# Patient Record
Sex: MALE | Race: BLACK OR AFRICAN AMERICAN | NOT HISPANIC OR LATINO | Employment: FULL TIME | URBAN - METROPOLITAN AREA
[De-identification: names, ages, dates, MRNs, and addresses within clinical notes are randomized per-mention and may not be internally consistent; named-entity substitution may affect disease eponyms.]

---

## 2019-10-25 ENCOUNTER — HOSPITAL ENCOUNTER (EMERGENCY)
Facility: HOSPITAL | Age: 49
Discharge: HOME | End: 2019-10-26
Attending: EMERGENCY MEDICINE
Payer: COMMERCIAL

## 2019-10-25 DIAGNOSIS — M54.9 BACK PAIN, UNSPECIFIED BACK LOCATION, UNSPECIFIED BACK PAIN LATERALITY, UNSPECIFIED CHRONICITY: Primary | ICD-10-CM

## 2019-10-25 DIAGNOSIS — M79.602 LEFT ARM PAIN: ICD-10-CM

## 2019-10-25 LAB — POCT GLUCOSE: 113 MG/DL (ref 70–110)

## 2019-10-25 PROCEDURE — 63600175 PHARM REV CODE 636 W HCPCS: Performed by: EMERGENCY MEDICINE

## 2019-10-25 PROCEDURE — 99284 EMERGENCY DEPT VISIT MOD MDM: CPT | Mod: 25

## 2019-10-25 PROCEDURE — 96372 THER/PROPH/DIAG INJ SC/IM: CPT

## 2019-10-25 PROCEDURE — 82962 GLUCOSE BLOOD TEST: CPT

## 2019-10-25 RX ORDER — CYCLOBENZAPRINE HCL 10 MG
5 TABLET ORAL NIGHTLY PRN
Qty: 3 TABLET | Refills: 0 | Status: SHIPPED | OUTPATIENT
Start: 2019-10-25 | End: 2019-10-30

## 2019-10-25 RX ORDER — KETOROLAC TROMETHAMINE 30 MG/ML
30 INJECTION, SOLUTION INTRAMUSCULAR; INTRAVENOUS
Status: COMPLETED | OUTPATIENT
Start: 2019-10-25 | End: 2019-10-25

## 2019-10-25 RX ORDER — ACETAMINOPHEN 500 MG
500 TABLET ORAL EVERY 6 HOURS PRN
Qty: 28 TABLET | Refills: 0 | Status: SHIPPED | OUTPATIENT
Start: 2019-10-25

## 2019-10-25 RX ORDER — MELOXICAM 7.5 MG/1
7.5 TABLET ORAL DAILY
Qty: 5 TABLET | Refills: 0 | Status: SHIPPED | OUTPATIENT
Start: 2019-10-25

## 2019-10-25 RX ADMIN — KETOROLAC TROMETHAMINE 30 MG: 30 INJECTION, SOLUTION INTRAMUSCULAR at 11:10

## 2019-10-26 VITALS
TEMPERATURE: 99 F | SYSTOLIC BLOOD PRESSURE: 151 MMHG | BODY MASS INDEX: 35.43 KG/M2 | OXYGEN SATURATION: 95 % | HEART RATE: 91 BPM | DIASTOLIC BLOOD PRESSURE: 88 MMHG | WEIGHT: 285 LBS | HEIGHT: 75 IN | RESPIRATION RATE: 18 BRPM

## 2019-10-26 NOTE — DISCHARGE INSTRUCTIONS
Please follow up with your primary doctor this week and return to the ED immediately if you have worsening or new symptoms.

## 2019-10-26 NOTE — ED PROVIDER NOTES
Encounter Date: 10/25/2019    SCRIBE #1 NOTE: I, Kavin Lorenzo, am scribing for, and in the presence of, Abe Hale MD.       History     Chief Complaint   Patient presents with    Back Pain     To ER with c/o lower back pain after working today and left arm pain after sleeping on it tonight.  Pt denies trying medication for pain.     Jeffrey Lockhart is a 48 y.o. male who  has no past medical history on file.    The patient presents to the ED due to back pain that started today. Patient reports intermittent low back pain that worsened after lifting and carrying heavy items today at therefore. No recent fall or trauma. He admits to left arm pain that worsened after sleeping on it tonight but denies bowel/bladder dysfunction, chest pain, SOB, abd pain, blood in stool, or hematuria.  He reports using both arms for heavy lifting as well today.  He does not normally left for work as he has had back problems in the past however there short staff today.  He has not tried any medication today.     The history is provided by the patient.     Review of patient's allergies indicates:  Allergies not on file  No past medical history on file.  No past surgical history on file.  No family history on file.  Social History     Tobacco Use    Smoking status: Not on file   Substance Use Topics    Alcohol use: Not on file    Drug use: Not on file     Review of Systems   Constitutional: Negative for chills and fever.   HENT: Negative for rhinorrhea, sore throat and trouble swallowing.    Eyes: Negative for visual disturbance.   Respiratory: Negative for cough and shortness of breath.    Cardiovascular: Negative for chest pain.   Gastrointestinal: Negative for abdominal pain, blood in stool, diarrhea and vomiting.   Genitourinary: Negative for dysuria and hematuria.   Musculoskeletal: Positive for back pain and myalgias (left arm).   Skin: Negative for rash.   Neurological: Negative for numbness and headaches.   Hematological:  Negative for adenopathy.   All other systems reviewed and are negative.      Physical Exam     Initial Vitals [10/25/19 2213]   BP Pulse Resp Temp SpO2   (!) 168/107 98 18 98.7 °F (37.1 °C) 99 %      MAP       --         Physical Exam    Nursing note and vitals reviewed.  Constitutional: He appears well-developed and well-nourished. No distress.   HENT:   Head: Normocephalic and atraumatic.   Eyes: EOM are normal. Pupils are equal, round, and reactive to light.   Neck: Normal range of motion. Neck supple.   Cardiovascular: Normal rate, regular rhythm, normal heart sounds and intact distal pulses.   Pulmonary/Chest: Breath sounds normal. No respiratory distress. He has no wheezes.   Abdominal: Soft. He exhibits no distension. There is no tenderness.   Musculoskeletal: Normal range of motion. He exhibits tenderness.   Diffuse lumbar tenderness.  Tenderness ove left vital   Negative SLR   Neurological: He is alert and oriented to person, place, and time. He displays normal reflexes. No cranial nerve deficit or sensory deficit.   Gait normal   Skin: Skin is warm and dry.         ED Course   Procedures  Labs Reviewed   POCT GLUCOSE MONITORING CONTINUOUS          Imaging Results    None          Medical Decision Making:   Differential Diagnosis:   Differential Diagnosis includes, but is not limited to:  Cauda equina syndrome, diskitis/osteomyelitis, epidural/paraspinal abscess, AAA, aortic dissection, post-op/hardware infection, trauma/vertebral fracture, spinal cord injury, disc herniation, spinal stenosis, sciatica, radiculopathy, neoplasm, lumbar muscle strain, muscle spasm, neuropathic pain, UTI/pyelonephritis, nephrolithiasis.  Clinical Tests:   Lab Tests: Ordered and Reviewed  ED Management:  48-year-old male presents with back pain after exerting himself with heavy lifting today work.  He denies trauma blood in his urine incontinence history of IV drug use or any red flags.  Patient was given IM Toradol with  improvement of his symptoms. I feel he is stable for discharge at this time I recommended symptomatic care including stretching heating pads and hydration.  Will discharge with Mobic acetaminophen and Flexeril nightly.  Return precautions for worsening or persistent pain numbness incontinence trouble walking or any other concerns.  I recommend he follow up with his primary doctor this week as well.    After taking into careful account the historical factors and physical exam findings of the patient's presentation today, in conjunction with the empirical and objective data obtained on ED workup, no acute emergent medical condition has been identified. The patient appears to be low risk for an emergent medical condition and I feel it is safe and appropriate at this time for the patient to be discharged to follow-up as detailed in their discharge instructions for reevaluation and possible continued outpatient workup and management. I have discussed the specifics of the workup with the patient and the patient has verbalized understanding of the details of the workup, the diagnosis, the treatment plan, and the need for outpatient follow-up.  Although the patient has no emergent etiology today this does not preclude the development of an emergent condition so in addition, I have advised the patient that they can return to the ED and/or activate EMS at any time with worsening of their symptoms, change of their symptoms, or with any other medical complaint.  The patient remained comfortable and stable during their visit in the ED.  Discharge and follow-up instructions discussed with the patient who expressed understanding and willingness to comply with my recommendations.                     ED Course as of Oct 26 0010   Fri Oct 25, 2019   2353 POCT glucose(!) [RN]      ED Course User Index  [RN] Abe Hale Jr., MD     Clinical Impression:       ICD-10-CM ICD-9-CM   1. Back pain, unspecified back location, unspecified  back pain laterality, unspecified chronicity M54.9 724.5   2. Left arm pain M79.602 729.5           Disposition:   Disposition: Discharged  Condition: Stable       I, Abe Hale,  personally performed the services described in this documentation. All medical record entries made by the scribe were at my direction and in my presence.  I have reviewed the chart and agree that the record reflects my personal performance and is accurate and complete. Abe Hale M.D. 4:35 PM10/26/2019    Portions of this note were dictated using voice recognition software and may contain dictation related errors in spelling/grammar/syntax not found on text review                   Abe Hale Jr., MD  10/26/19 6581